# Patient Record
Sex: FEMALE | Race: WHITE | Employment: OTHER | URBAN - METROPOLITAN AREA
[De-identification: names, ages, dates, MRNs, and addresses within clinical notes are randomized per-mention and may not be internally consistent; named-entity substitution may affect disease eponyms.]

---

## 2019-10-04 RX ORDER — ESTRADIOL 0.1 MG/G
2 CREAM VAGINAL
COMMUNITY

## 2019-10-04 RX ORDER — ASPIRIN 81 MG/1
81 TABLET ORAL DAILY
COMMUNITY

## 2019-10-04 RX ORDER — CLOBETASOL PROPIONATE 0.5 MG/G
CREAM TOPICAL 2 TIMES DAILY
COMMUNITY

## 2019-10-04 RX ORDER — VENLAFAXINE 75 MG/1
75 TABLET ORAL 2 TIMES DAILY
COMMUNITY

## 2019-10-04 RX ORDER — METOPROLOL SUCCINATE 25 MG/1
25 TABLET, EXTENDED RELEASE ORAL DAILY
COMMUNITY

## 2019-10-04 RX ORDER — ROSUVASTATIN CALCIUM 20 MG/1
20 TABLET, COATED ORAL DAILY
COMMUNITY

## 2019-10-04 RX ORDER — LISINOPRIL 5 MG/1
5 TABLET ORAL DAILY
COMMUNITY

## 2019-10-07 ENCOUNTER — HOSPITAL ENCOUNTER (OUTPATIENT)
Facility: CLINIC | Age: 64
Discharge: HOME | End: 2019-10-07
Attending: PODIATRIST | Admitting: PODIATRIST
Payer: COMMERCIAL

## 2019-10-07 ENCOUNTER — ANESTHESIA EVENT (OUTPATIENT)
Dept: SURGERY | Facility: CLINIC | Age: 64
End: 2019-10-07
Payer: COMMERCIAL

## 2019-10-07 ENCOUNTER — ANESTHESIA (OUTPATIENT)
Dept: SURGERY | Facility: CLINIC | Age: 64
End: 2019-10-07
Payer: COMMERCIAL

## 2019-10-07 VITALS
HEART RATE: 63 BPM | SYSTOLIC BLOOD PRESSURE: 106 MMHG | TEMPERATURE: 97.6 F | BODY MASS INDEX: 27.92 KG/M2 | RESPIRATION RATE: 16 BRPM | WEIGHT: 151.7 LBS | DIASTOLIC BLOOD PRESSURE: 57 MMHG | HEIGHT: 62 IN | OXYGEN SATURATION: 96 %

## 2019-10-07 DIAGNOSIS — M21.611 BUNION, RIGHT: Primary | ICD-10-CM

## 2019-10-07 PROCEDURE — 27210794 ZZH OR GENERAL SUPPLY STERILE: Performed by: PODIATRIST

## 2019-10-07 PROCEDURE — 25800030 ZZH RX IP 258 OP 636: Performed by: REGISTERED NURSE

## 2019-10-07 PROCEDURE — 25000125 ZZHC RX 250: Performed by: PODIATRIST

## 2019-10-07 PROCEDURE — 25000125 ZZHC RX 250: Performed by: REGISTERED NURSE

## 2019-10-07 PROCEDURE — C1713 ANCHOR/SCREW BN/BN,TIS/BN: HCPCS | Performed by: PODIATRIST

## 2019-10-07 PROCEDURE — 25000128 H RX IP 250 OP 636: Performed by: REGISTERED NURSE

## 2019-10-07 PROCEDURE — 40000170 ZZH STATISTIC PRE-PROCEDURE ASSESSMENT II: Performed by: PODIATRIST

## 2019-10-07 PROCEDURE — 71000027 ZZH RECOVERY PHASE 2 EACH 15 MINS: Performed by: PODIATRIST

## 2019-10-07 PROCEDURE — 25000128 H RX IP 250 OP 636: Performed by: PODIATRIST

## 2019-10-07 PROCEDURE — 36000058 ZZH SURGERY LEVEL 3 EA 15 ADDTL MIN: Performed by: PODIATRIST

## 2019-10-07 PROCEDURE — 71000012 ZZH RECOVERY PHASE 1 LEVEL 1 FIRST HR: Performed by: PODIATRIST

## 2019-10-07 PROCEDURE — 37000008 ZZH ANESTHESIA TECHNICAL FEE, 1ST 30 MIN: Performed by: PODIATRIST

## 2019-10-07 PROCEDURE — 37000009 ZZH ANESTHESIA TECHNICAL FEE, EACH ADDTL 15 MIN: Performed by: PODIATRIST

## 2019-10-07 PROCEDURE — 36000056 ZZH SURGERY LEVEL 3 1ST 30 MIN: Performed by: PODIATRIST

## 2019-10-07 DEVICE — IMPLANTABLE DEVICE: Type: IMPLANTABLE DEVICE | Site: FOOT | Status: FUNCTIONAL

## 2019-10-07 RX ORDER — EPHEDRINE SULFATE 50 MG/ML
INJECTION, SOLUTION INTRAMUSCULAR; INTRAVENOUS; SUBCUTANEOUS PRN
Status: DISCONTINUED | OUTPATIENT
Start: 2019-10-07 | End: 2019-10-07

## 2019-10-07 RX ORDER — HYDROMORPHONE HYDROCHLORIDE 1 MG/ML
.3-.5 INJECTION, SOLUTION INTRAMUSCULAR; INTRAVENOUS; SUBCUTANEOUS EVERY 10 MIN PRN
Status: DISCONTINUED | OUTPATIENT
Start: 2019-10-07 | End: 2019-10-07 | Stop reason: HOSPADM

## 2019-10-07 RX ORDER — OXYCODONE HYDROCHLORIDE 5 MG/1
5 TABLET ORAL
Status: DISCONTINUED | OUTPATIENT
Start: 2019-10-07 | End: 2019-10-07 | Stop reason: HOSPADM

## 2019-10-07 RX ORDER — FENTANYL CITRATE 50 UG/ML
25-50 INJECTION, SOLUTION INTRAMUSCULAR; INTRAVENOUS EVERY 5 MIN PRN
Status: DISCONTINUED | OUTPATIENT
Start: 2019-10-07 | End: 2019-10-07 | Stop reason: HOSPADM

## 2019-10-07 RX ORDER — LIDOCAINE HYDROCHLORIDE 20 MG/ML
INJECTION, SOLUTION INFILTRATION; PERINEURAL PRN
Status: DISCONTINUED | OUTPATIENT
Start: 2019-10-07 | End: 2019-10-07 | Stop reason: HOSPADM

## 2019-10-07 RX ORDER — ONDANSETRON 2 MG/ML
INJECTION INTRAMUSCULAR; INTRAVENOUS PRN
Status: DISCONTINUED | OUTPATIENT
Start: 2019-10-07 | End: 2019-10-07

## 2019-10-07 RX ORDER — DEXAMETHASONE SODIUM PHOSPHATE 4 MG/ML
INJECTION, SOLUTION INTRA-ARTICULAR; INTRALESIONAL; INTRAMUSCULAR; INTRAVENOUS; SOFT TISSUE PRN
Status: DISCONTINUED | OUTPATIENT
Start: 2019-10-07 | End: 2019-10-07

## 2019-10-07 RX ORDER — HYDROCODONE BITARTRATE AND ACETAMINOPHEN 5; 325 MG/1; MG/1
1-2 TABLET ORAL EVERY 4 HOURS PRN
Qty: 10 TABLET | Refills: 0 | Status: SHIPPED | OUTPATIENT
Start: 2019-10-07

## 2019-10-07 RX ORDER — SODIUM CHLORIDE, SODIUM LACTATE, POTASSIUM CHLORIDE, CALCIUM CHLORIDE 600; 310; 30; 20 MG/100ML; MG/100ML; MG/100ML; MG/100ML
INJECTION, SOLUTION INTRAVENOUS CONTINUOUS PRN
Status: DISCONTINUED | OUTPATIENT
Start: 2019-10-07 | End: 2019-10-07

## 2019-10-07 RX ORDER — NALOXONE HYDROCHLORIDE 0.4 MG/ML
.1-.4 INJECTION, SOLUTION INTRAMUSCULAR; INTRAVENOUS; SUBCUTANEOUS
Status: DISCONTINUED | OUTPATIENT
Start: 2019-10-07 | End: 2019-10-07 | Stop reason: HOSPADM

## 2019-10-07 RX ORDER — ONDANSETRON 2 MG/ML
4 INJECTION INTRAMUSCULAR; INTRAVENOUS EVERY 30 MIN PRN
Status: DISCONTINUED | OUTPATIENT
Start: 2019-10-07 | End: 2019-10-07 | Stop reason: HOSPADM

## 2019-10-07 RX ORDER — BUPIVACAINE HYDROCHLORIDE 5 MG/ML
INJECTION, SOLUTION PERINEURAL PRN
Status: DISCONTINUED | OUTPATIENT
Start: 2019-10-07 | End: 2019-10-07 | Stop reason: HOSPADM

## 2019-10-07 RX ORDER — FENTANYL CITRATE 50 UG/ML
INJECTION, SOLUTION INTRAMUSCULAR; INTRAVENOUS PRN
Status: DISCONTINUED | OUTPATIENT
Start: 2019-10-07 | End: 2019-10-07

## 2019-10-07 RX ORDER — SODIUM CHLORIDE, SODIUM LACTATE, POTASSIUM CHLORIDE, CALCIUM CHLORIDE 600; 310; 30; 20 MG/100ML; MG/100ML; MG/100ML; MG/100ML
INJECTION, SOLUTION INTRAVENOUS CONTINUOUS
Status: DISCONTINUED | OUTPATIENT
Start: 2019-10-07 | End: 2019-10-07 | Stop reason: HOSPADM

## 2019-10-07 RX ORDER — ONDANSETRON 4 MG/1
4 TABLET, ORALLY DISINTEGRATING ORAL EVERY 30 MIN PRN
Status: DISCONTINUED | OUTPATIENT
Start: 2019-10-07 | End: 2019-10-07 | Stop reason: HOSPADM

## 2019-10-07 RX ORDER — CLINDAMYCIN PHOSPHATE 600 MG/50ML
INJECTION, SOLUTION INTRAVENOUS PRN
Status: DISCONTINUED | OUTPATIENT
Start: 2019-10-07 | End: 2019-10-07

## 2019-10-07 RX ORDER — CEFAZOLIN SODIUM 2 G/100ML
2 INJECTION, SOLUTION INTRAVENOUS
Status: DISCONTINUED | OUTPATIENT
Start: 2019-10-07 | End: 2019-10-07 | Stop reason: HOSPADM

## 2019-10-07 RX ORDER — PROPOFOL 10 MG/ML
INJECTION, EMULSION INTRAVENOUS CONTINUOUS PRN
Status: DISCONTINUED | OUTPATIENT
Start: 2019-10-07 | End: 2019-10-07

## 2019-10-07 RX ORDER — LIDOCAINE HYDROCHLORIDE 20 MG/ML
INJECTION, SOLUTION INFILTRATION; PERINEURAL PRN
Status: DISCONTINUED | OUTPATIENT
Start: 2019-10-07 | End: 2019-10-07

## 2019-10-07 RX ADMIN — PHENYLEPHRINE HYDROCHLORIDE 100 MCG: 10 INJECTION INTRAVENOUS at 08:23

## 2019-10-07 RX ADMIN — DEXAMETHASONE SODIUM PHOSPHATE 4 MG: 4 INJECTION, SOLUTION INTRA-ARTICULAR; INTRALESIONAL; INTRAMUSCULAR; INTRAVENOUS; SOFT TISSUE at 07:40

## 2019-10-07 RX ADMIN — SODIUM CHLORIDE, POTASSIUM CHLORIDE, SODIUM LACTATE AND CALCIUM CHLORIDE: 600; 310; 30; 20 INJECTION, SOLUTION INTRAVENOUS at 07:30

## 2019-10-07 RX ADMIN — PHENYLEPHRINE HYDROCHLORIDE 100 MCG: 10 INJECTION INTRAVENOUS at 08:10

## 2019-10-07 RX ADMIN — LIDOCAINE HYDROCHLORIDE 60 MG: 20 INJECTION, SOLUTION INFILTRATION; PERINEURAL at 07:33

## 2019-10-07 RX ADMIN — PHENYLEPHRINE HYDROCHLORIDE 100 MCG: 10 INJECTION INTRAVENOUS at 07:58

## 2019-10-07 RX ADMIN — PHENYLEPHRINE HYDROCHLORIDE 100 MCG: 10 INJECTION INTRAVENOUS at 08:34

## 2019-10-07 RX ADMIN — PHENYLEPHRINE HYDROCHLORIDE 100 MCG: 10 INJECTION INTRAVENOUS at 08:44

## 2019-10-07 RX ADMIN — FENTANYL CITRATE 50 MCG: 50 INJECTION, SOLUTION INTRAMUSCULAR; INTRAVENOUS at 07:32

## 2019-10-07 RX ADMIN — PHENYLEPHRINE HYDROCHLORIDE 100 MCG: 10 INJECTION INTRAVENOUS at 08:50

## 2019-10-07 RX ADMIN — PHENYLEPHRINE HYDROCHLORIDE 100 MCG: 10 INJECTION INTRAVENOUS at 08:28

## 2019-10-07 RX ADMIN — Medication 10 MG: at 07:50

## 2019-10-07 RX ADMIN — PROPOFOL 100 MCG/KG/MIN: 10 INJECTION, EMULSION INTRAVENOUS at 07:33

## 2019-10-07 RX ADMIN — PHENYLEPHRINE HYDROCHLORIDE 100 MCG: 10 INJECTION INTRAVENOUS at 08:17

## 2019-10-07 RX ADMIN — MIDAZOLAM 2 MG: 1 INJECTION INTRAMUSCULAR; INTRAVENOUS at 07:30

## 2019-10-07 RX ADMIN — PHENYLEPHRINE HYDROCHLORIDE 100 MCG: 10 INJECTION INTRAVENOUS at 08:05

## 2019-10-07 RX ADMIN — ONDANSETRON 4 MG: 2 INJECTION INTRAMUSCULAR; INTRAVENOUS at 08:29

## 2019-10-07 RX ADMIN — CLINDAMYCIN PHOSPHATE 600 MG: 12 INJECTION, SOLUTION INTRAVENOUS at 07:38

## 2019-10-07 RX ADMIN — Medication 5 MG: at 07:48

## 2019-10-07 ASSESSMENT — LIFESTYLE VARIABLES: TOBACCO_USE: 0

## 2019-10-07 ASSESSMENT — COPD QUESTIONNAIRES: COPD: 0

## 2019-10-07 ASSESSMENT — MIFFLIN-ST. JEOR: SCORE: 1196.36

## 2019-10-07 NOTE — OP NOTE
Procedure Date: 10/07/2019      INDICATION FOR SURGERY:  The patient has suffered with painful bunion deformities that have progressively worsened over a number of years.  Approximately 3 months ago, she had the left deformity corrected and now she presents for correction of the right bunion deformity.  The radiographs of the right foot correlate well with clinical findings as there is increased first intermetatarsal angle with increased hallux abductus angle.  The tibial sesamoid is in the number 3 position.      PREOPERATIVE DIAGNOSIS:  Hallux abductovalgus with metatarsus primus adductus, right.      POSTOPERATIVE DIAGNOSIS:  Hallux abductovalgus with metatarsus primus adductus, right.      SURGEON:  Bebeto Alvarez DPM      ANESTHESIA:  MAC      PROCEDURE:  Modified Jose bunionectomy with Trilliant screw fixation, right.      INDICATIONS FOR PROCEDURE:  The patient was taken to the OR and placed in supine position.  The patient was anesthetized via a Latif block to the first ray of the right foot.  The right foot was prepped and draped in the usual aseptic technique.  Pneumatic cuff at distal one-third aspect of the right leg was used for hemostasis.      A curvilinear incision was placed over the medial dorsal aspect of the first metatarsophalangeal joint, extending from the base of the first proximal phalanx to the midshaft of the first metatarsal.  Skin incisions were deepened, skin flaps underscored and vital structures were retracted.  Superficial vessels were identified and coagulated utilizing the cautery unit.  Utilizing blunt and sharp dissection, the incision was carried deep into the first intermetatarsal space and a lateral release was performed by releasing the deep transverse intermetatarsal ligament, the adductor hallucis tendon and the fibular sesamoid.  A linear incision was then placed through the capsule over the medial dorsal aspect of the first metatarsophalangeal joint and extended through the  periosteum over the distal half of the first metatarsal.  Utilizing sharp and blunt dissection, the capsule and periosteum were reflected from the head and distal half of the first metatarsal.  The hypertrophied medial eminence at the head of the first metatarsal was identified and resected utilizing a sagittal saw.  Utilizing an oscillating saw, a transverse V osteotomy was placed through the head of the first metatarsal with the apex approximately 1 cm proximal to the articulating cartilage.  The arms of the osteotomy were approximately 50 degrees apart with the dorsal arm relatively long for acceptance of the screws.  Capital fragment was displaced laterally approximately 5 mm.  It was impacted on the shaft and noted to be in good position and was fixated utilizing 2 Trilliant screws with the distal screw, a 2.4 screw 14 mm in length and the proximal screw, a 2.0 screw 14 mm in length.  These were placed in a lag type fashion.  The protruding bone at the distal medial aspect of the first metatarsal shaft was resected flush with the capital fragment utilizing the sagittal saw.  Again, utilizing the sagittal saw, capital fragment was remodeled so all sharp edges and bony prominences were removed.  The surgical site was flushed with copious amounts of sterile saline, inspected, and found to be free of debris.  The capsule, periosteum, deep and subcutaneous tissues were closed in layers utilizing continuous and interrupted sutures of 3-0 Vicryl.  The skin was closed utilizing one subcuticular suture of 5-0 Vicryl.  The incision was further maintained with tape and stockinette.  The deep and subcutaneous tissues were closed in layers utilizing continuous and interrupted sutures of 3-0 Vicryl.  The skin was closed utilizing one subcuticular suture of 5-0 Vicryl.  The incision was further maintained with Benzoin and Steri-Strips.      Approximately 1 mL of dexamethasone and 10 mL of 0.5% was infiltrated at the surgical  site for postoperative edema and pain respectively.  Adaptic was applied to the surgical site followed by dressings of fluffs, 4 x 4s, and 2-inch Nimo.  The pneumatic cuff at the distal one-third aspect of the right leg was released and capillary filling time to all digits of the right foot were found to be within normal limits.  The dressing was further maintained with tape and stockinette.      The patient tolerated the procedure well and left the OR in stable condition with less than 3 mL of estimated blood loss.  The patient was given postoperative instructions and Rx for postoperative medications.  The patient will be followed up in 1 week in the office.         PA BOCANEGRA DPM             D: 10/07/2019   T: 10/07/2019   MT: BIANCA      Name:     BARBER RIVERA   MRN:      [id number]        Account:        [id number]   :      1955           Procedure Date: 10/07/2019      Document: [id number]

## 2019-10-07 NOTE — DISCHARGE INSTRUCTIONS
You may restart your aspirin today    Same Day Surgery Discharge Instructions for  Sedation and General Anesthesia       It's not unusual to feel dizzy, light-headed or faint for up to 24 hours after surgery or while taking pain medication.  If you have these symptoms: sit for a few minutes before standing and have someone assist you when you get up to walk or use the bathroom.      You should rest and relax for the next 24 hours. We recommend you make arrangements to have an adult stay with you for at least 24 hours after your discharge.  Avoid hazardous and strenuous activity.      DO NOT DRIVE any vehicle or operate mechanical equipment for 24 hours following the end of your surgery.  Even though you may feel normal, your reactions may be affected by the medication you have received.      Do not drink alcoholic beverages for 24 hours following surgery.       Slowly progress to your regular diet as you feel able. It's not unusual to feel nauseated and/or vomit after receiving anesthesia.  If you develop these symptoms, drink clear liquids (apple juice, ginger ale, broth, 7-up, etc. ) until you feel better.  If your nausea and vomiting persists for 24 hours, please notify your surgeon.        All narcotic pain medications, along with inactivity and anesthesia, can cause constipation. Drinking plenty of liquids and increasing fiber intake will help.      For any questions of a medical nature, call your surgeon.      Do not make important decisions for 24 hours.      If you had general anesthesia, you may have a sore throat for a couple of days related to the breathing tube used during surgery.  You may use Cepacol lozenges to help with this discomfort.  If it worsens or if you develop a fever, contact your surgeon.       If you feel your pain is not well managed with the pain medications prescribed by your surgeon, please contact your surgeon's office to let them know so they can address your concerns.        [facility]  Discharge Instructions  Foot and Ankle Surgery  Bebeto Alvarez DPM          Apply ice packs. Post-operative swelling and pain are greatly reduced with the use of ice packs. The first 2-3 days after surgery use ice packs 20 to 30 minutes of each hour. The ice packs should be applied to the top and on the inside of the ankle. You may apply ice packs behind the knee. It is not necessary to apply the ice packs during the night while you are sleeping.    Take medication(s) prescribed by your physician according to directions.    Do not be alarmed if there is some slight bleeding on the bandages, as this is normal. If the bleeding seems excessive, call Dr. Alvarez.    Follow a light diet, drink plenty of fluids and abstain from the use of any alcoholic beverages for three days following surgery.    Sit in a chair with feet elevated. Use surgical shoe(s) and/or crutches, wheelchair or knee and leg walker as directed by Dr. Alvarez.    Keep bandages completely dry. If any of the ice packs show evidence of becoming damp, do not use them.    Do not remove bandages.    Do not cover the surgical dressing with a plastic bag, unless it is for a short time to bathe.    If any problems develop, contact Dr. Alvarez immediately at [phone].    Carefully following these instructions aids in the successful outcome of your surgery.                                                                    **If you have questions or concerns about your procedure,  call Dr. Alvarez at [phone]**

## 2019-10-07 NOTE — ANESTHESIA POSTPROCEDURE EVALUATION
Patient: Tessy Diaz    Procedure(s):  CORRECTION OF BUNION DEFORMITY RIGHT FOOT (TRILLIANT SCREWS, OSCILLATING SAW)^    Diagnosis:HALLUX VALGUS OF THE RIGHT FOOT  Diagnosis Additional Information: No value filed.    Anesthesia Type:  MAC    Note:  Anesthesia Post Evaluation    Patient location during evaluation: PACU  Patient participation: Able to fully participate in evaluation  Level of consciousness: awake and alert  Pain management: adequate  Airway patency: patent  Cardiovascular status: acceptable  Respiratory status: acceptable  Hydration status: acceptable  PONV: none     Anesthetic complications: None          Last vitals:  Vitals:    10/07/19 0915 10/07/19 0930 10/07/19 0940   BP: 103/64 97/58 100/56   Pulse: 62 63    Resp: 12 19 16   Temp:  36.4  C (97.6  F)    SpO2: 100% 95% 96%         Electronically Signed By: Fran Morris MD  October 7, 2019  9:46 AM

## 2019-10-07 NOTE — ANESTHESIA PREPROCEDURE EVALUATION
Anesthesia Pre-Procedure Evaluation    Patient: Tessy Diaz   MRN: [id number] : 1955          Preoperative Diagnosis: HALLUX VALGUS OF THE RIGHT FOOT    Procedure(s):  CORRECTION OF BUNION DEFORMITY RIGHT FOOT (TRILLIANT SCREWS, OSCILLATING SAW)^    Past Medical History:   Diagnosis Date     Anxiety      Cardiomyopathy (H)      Hypercholesterolemia      LBBB (left bundle branch block)      Lichen simplex chronicus      Vitamin D deficiency      Past Surgical History:   Procedure Laterality Date     BUNIONECTOMY  2019      SECTION       COLONOSCOPY       wisdome teeth         Anesthesia Evaluation     . Pt has had prior anesthetic. Type: General    No history of anesthetic complications          ROS/MED HX    ENT/Pulmonary:      (-) tobacco use, asthma and COPD   Neurologic:      (-) CVA, TIA and Neuropathy   Cardiovascular: Comment: Cardiomyopathy    (+) ----. : . . . :. . Previous cardiac testing [place]:results: [facility]   [address] [place] [address] [address] [address]   Main: [phone] [email or web address]                                                 Transthoracic Echo Report   TESSY DIAZ   Shania ID: [id number] Age: 63 : 1955 Ordering Provider: RUBIA CUENCA   Exam Date: 2019 09:33 Gender: F Sonographer: NUBIA   Accession #: [id number] Height: 62 in BSA: 1.68 m  BP: 120 / 70   Weight: 148 lbs BMI: 27.1 kg/m  HR: 60     Location: [facility] Rhythm: Normal Sinus Rhythm   Procedure Components: 2D imaging, Color Doppler, Spectral Doppler   Indications: Cardiomyopathy, primary (HC)   Technical Quality: Fair Contrast: None     Final Conclusion Previous Study: 2018   1. Normal left ventricular chamber size and mildly reduced systolic function.   Estimated left   ventricular ejection fraction is 45-50% .   Abnormal septal and apical motion due to LBBB.   Normal left ventricular wall  "thickness.     2. Normal right ventricular size and systolic function.   Right ventricular systolic pressure cannot be estimated due to inability to detect peak   tricuspid regurgitation Doppler velocity.     3. No significant valvular heart disease.     4. When compared to the previous echocardiographic images of 12/19/2018, there has been no   significant change.date: results: date: results: date: results:         (-) hypertension, CAD, irregular heartbeat/palpitations and stent   METS/Exercise Tolerance:  4 - Raking leaves, gardening   Hematologic:        (-) anemia   Musculoskeletal:         GI/Hepatic:     (+) GERD      (-) liver disease   Renal/Genitourinary:      (-) renal disease   Endo:      (-) Type I DM, Type II DM and thyroid disease   Psychiatric:     (+) psychiatric history anxiety      Infectious Disease:  - neg infectious disease ROS       Malignancy:         Other:                          Physical Exam  Normal systems: cardiovascular, pulmonary and dental    Airway   Mallampati: III  TM distance: >3 FB  Neck ROM: full    Dental     Cardiovascular   Rhythm and rate: regular and normal      Pulmonary    breath sounds clear to auscultation            No results found for: WBC, HGB, HCT, PLT, CRP, SED, NA, POTASSIUM, CHLORIDE, CO2, BUN, CR, GLC, KATHERINE, PHOS, MAG, ALBUMIN, PROTTOTAL, ALT, AST, GGT, ALKPHOS, BILITOTAL, BILIDIRECT, LIPASE, AMYLASE, GARFIELD, PTT, INR, FIBR, TSH, T4, T3, HCG, HCGS, CKTOTAL, CKMB, TROPN    Preop Vitals  BP Readings from Last 3 Encounters:   10/07/19 107/62    Pulse Readings from Last 3 Encounters:   10/07/19 60      Resp Readings from Last 3 Encounters:   10/07/19 18    SpO2 Readings from Last 3 Encounters:   10/07/19 100%      Temp Readings from Last 1 Encounters:   10/07/19 36.2  C (97.2  F) (Oral)    Ht Readings from Last 1 Encounters:   10/07/19 1.575 m (5' 2\")      Wt Readings from Last 1 Encounters:   10/07/19 68.8 kg (151 lb 11.2 oz)    Estimated body mass index is 27.75 " "kg/m  as calculated from the following:    Height as of this encounter: 1.575 m (5' 2\").    Weight as of this encounter: 68.8 kg (151 lb 11.2 oz).       Anesthesia Plan      History & Physical Review  History and physical reviewed and following examination; no interval change.    ASA Status:  2 .    NPO Status:  > 6 hours    Plan for MAC (with GA backup) with Intravenous induction. Maintenance will be TIVA.  Reason for MAC:  Deep or markedly invasive procedure (G8)  PONV prophylaxis:  Ondansetron (or other 5HT-3) and Dexamethasone or Solumedrol       Postoperative Care  Postoperative pain management:  Oral pain medications.      Consents  Anesthetic plan, risks, benefits and alternatives discussed with:  Patient (Including possibility of intraoperative awareness or recall.)..                 Fran Morris MD  "

## 2019-10-07 NOTE — ANESTHESIA CARE TRANSFER NOTE
Patient: Tessy Diaz    Procedure(s):  CORRECTION OF BUNION DEFORMITY RIGHT FOOT (TRILLIANT SCREWS, OSCILLATING SAW)^    Diagnosis: HALLUX VALGUS OF THE RIGHT FOOT  Diagnosis Additional Information: No value filed.    Anesthesia Type:   MAC     Note:  Airway :Face Mask  Patient transferred to:PACU  Comments: Transferred to PACU, spontaneous respirations, on 6L via FM.  All monitors and alarms on and functioning, VSS.  Patient awake, comfortable.  Report given to PACU RN.Handoff Report: Identifed the Patient, Identified the Reponsible Provider, Reviewed the pertinent medical history, Discussed the surgical course, Reviewed Intra-OP anesthesia mangement and issues during anesthesia, Set expectations for post-procedure period and Allowed opportunity for questions and acknowledgement of understanding      Vitals: (Last set prior to Anesthesia Care Transfer)    CRNA VITALS  10/7/2019 0827 - 10/7/2019 0902      10/7/2019             Resp Rate (set):  10                Electronically Signed By: ALLI Davidson CRNA  October 7, 2019  9:02 AM

## (undated) DEVICE — DRSG STERI STRIP 1/2X4" R1547

## (undated) DEVICE — CAST PADDING 4" COTTON WEBRIL UNSTERILE 9084

## (undated) DEVICE — GLOVE PROTEXIS W/NEU-THERA 8.5  2D73TE85

## (undated) DEVICE — SU VICRYL 5-0 CPS-3 18" J844G

## (undated) DEVICE — BNDG ROLLER GAUZE CONFORM 2"X4YD 41-52

## (undated) DEVICE — NDL 27GA 1.25" 305136

## (undated) DEVICE — DRSG GAUZE 4X4" 3033

## (undated) DEVICE — SYR 10ML FINGER CONTROL W/O NDL 309695

## (undated) DEVICE — PACK EXTREMITY SOP15EXFSD

## (undated) DEVICE — PREP SKIN SCRUB TRAY 4461A

## (undated) DEVICE — PIN GUARD 10-1-001 101001PBX

## (undated) DEVICE — KIT SURGICAL TURNOVER FVSD-01D

## (undated) DEVICE — DRSG KERLIX FLUFFS X5

## (undated) DEVICE — SU VICRYL 3-0 PS-2 27" UND J427H

## (undated) DEVICE — NDL 19GA 1.5"

## (undated) DEVICE — LINEN TOWEL PACK X5 5464

## (undated) DEVICE — CAST PADDING 4" STERILE 9044S

## (undated) DEVICE — Device

## (undated) DEVICE — GLOVE PROTEXIS MICRO 8.0  2D73PM80

## (undated) DEVICE — SOL WATER IRRIG 1000ML BOTTLE 2F7114

## (undated) DEVICE — SYR 03ML LL W/O NDL 309657

## (undated) DEVICE — SOL NACL 0.9% IRRIG 1000ML BOTTLE 2F7124

## (undated) DEVICE — SOL ADH LIQUID BENZOIN SWAB 0.6ML C1544

## (undated) DEVICE — BLADE KNIFE SURG 15 371115

## (undated) DEVICE — K WIRE

## (undated) RX ORDER — PROPOFOL 10 MG/ML
INJECTION, EMULSION INTRAVENOUS
Status: DISPENSED
Start: 2019-10-07

## (undated) RX ORDER — LIDOCAINE HYDROCHLORIDE 10 MG/ML
INJECTION, SOLUTION EPIDURAL; INFILTRATION; INTRACAUDAL; PERINEURAL
Status: DISPENSED
Start: 2019-10-07

## (undated) RX ORDER — LIDOCAINE HYDROCHLORIDE 20 MG/ML
INJECTION, SOLUTION EPIDURAL; INFILTRATION; INTRACAUDAL; PERINEURAL
Status: DISPENSED
Start: 2019-10-07

## (undated) RX ORDER — DEXAMETHASONE SODIUM PHOSPHATE 4 MG/ML
INJECTION, SOLUTION INTRA-ARTICULAR; INTRALESIONAL; INTRAMUSCULAR; INTRAVENOUS; SOFT TISSUE
Status: DISPENSED
Start: 2019-10-07

## (undated) RX ORDER — CLINDAMYCIN PHOSPHATE 600 MG/50ML
INJECTION, SOLUTION INTRAVENOUS
Status: DISPENSED
Start: 2019-10-07

## (undated) RX ORDER — ONDANSETRON 2 MG/ML
INJECTION INTRAMUSCULAR; INTRAVENOUS
Status: DISPENSED
Start: 2019-10-07

## (undated) RX ORDER — FENTANYL CITRATE 50 UG/ML
INJECTION, SOLUTION INTRAMUSCULAR; INTRAVENOUS
Status: DISPENSED
Start: 2019-10-07

## (undated) RX ORDER — LIDOCAINE HYDROCHLORIDE 20 MG/ML
INJECTION, SOLUTION INFILTRATION; PERINEURAL
Status: DISPENSED
Start: 2019-10-07

## (undated) RX ORDER — BUPIVACAINE HYDROCHLORIDE 5 MG/ML
INJECTION, SOLUTION EPIDURAL; INTRACAUDAL
Status: DISPENSED
Start: 2019-10-07

## (undated) RX ORDER — CEFAZOLIN SODIUM 2 G/100ML
INJECTION, SOLUTION INTRAVENOUS
Status: DISPENSED
Start: 2019-10-07